# Patient Record
Sex: MALE | Race: WHITE
[De-identification: names, ages, dates, MRNs, and addresses within clinical notes are randomized per-mention and may not be internally consistent; named-entity substitution may affect disease eponyms.]

---

## 2017-05-02 ENCOUNTER — HOSPITAL ENCOUNTER (OUTPATIENT)
Dept: HOSPITAL 87 - LAB | Age: 57
Discharge: HOME | End: 2017-05-02
Attending: INTERNAL MEDICINE
Payer: COMMERCIAL

## 2017-05-02 DIAGNOSIS — K76.89: Primary | ICD-10-CM

## 2017-05-02 LAB
ALBUMIN SERPL BCP-MCNC: 4.2 G/DL (ref 3.4–5)
ALT SERPL W P-5'-P-CCNC: 32 IU/L (ref 13–61)
BILIRUB DIRECT SERPL-MCNC: 0.1 MG/DL (ref 0–0.2)
HEMOLYSIS INTERF INDEX SERPL-ACNC: 1 (ref 1–3)
ICTERIC INTERF INDEX SERPL-ACNC: 1 (ref 1–4)
LIPEMIC INTERF INDEX SERPL-ACNC: 1 (ref 1–3)

## 2017-05-02 PROCEDURE — 36415 COLL VENOUS BLD VENIPUNCTURE: CPT

## 2017-05-02 PROCEDURE — 80076 HEPATIC FUNCTION PANEL: CPT

## 2017-05-19 ENCOUNTER — HOSPITAL ENCOUNTER (OUTPATIENT)
Dept: HOSPITAL 87 - LAB | Age: 57
Discharge: HOME | End: 2017-05-19
Attending: UROLOGY
Payer: COMMERCIAL

## 2017-05-19 DIAGNOSIS — N47.1: Primary | ICD-10-CM

## 2017-05-19 DIAGNOSIS — R35.1: ICD-10-CM

## 2017-05-19 PROCEDURE — 36415 COLL VENOUS BLD VENIPUNCTURE: CPT

## 2017-05-19 PROCEDURE — 84153 ASSAY OF PSA TOTAL: CPT

## 2017-05-22 ENCOUNTER — HOSPITAL ENCOUNTER (OUTPATIENT)
Dept: HOSPITAL 87 - LAB | Age: 57
Discharge: HOME | End: 2017-05-22
Attending: INTERNAL MEDICINE
Payer: COMMERCIAL

## 2017-05-22 DIAGNOSIS — E03.9: Primary | ICD-10-CM

## 2017-05-22 PROCEDURE — 36415 COLL VENOUS BLD VENIPUNCTURE: CPT

## 2017-05-22 PROCEDURE — 84443 ASSAY THYROID STIM HORMONE: CPT

## 2017-08-10 ENCOUNTER — HOSPITAL ENCOUNTER (OUTPATIENT)
Dept: HOSPITAL 87 - MRI | Age: 57
Discharge: HOME | End: 2017-08-10
Payer: COMMERCIAL
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
## 2017-08-10 DIAGNOSIS — R20.0: ICD-10-CM

## 2017-08-10 DIAGNOSIS — M47.894: ICD-10-CM

## 2017-08-10 DIAGNOSIS — M48.06: ICD-10-CM
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
## 2017-08-10 DIAGNOSIS — S30.0XXA: Primary | ICD-10-CM

## 2017-08-10 DIAGNOSIS — S39.002A: ICD-10-CM

## 2017-08-10 PROCEDURE — 72148 MRI LUMBAR SPINE W/O DYE: CPT

## 2017-08-10 PROCEDURE — 72146 MRI CHEST SPINE W/O DYE: CPT

## 2017-09-20 ENCOUNTER — HOSPITAL ENCOUNTER (OUTPATIENT)
Dept: HOSPITAL 87 - LAB | Age: 57
Discharge: HOME | End: 2017-09-20
Attending: INTERNAL MEDICINE
Payer: COMMERCIAL

## 2017-09-20 DIAGNOSIS — Z00.00: Primary | ICD-10-CM

## 2017-09-20 LAB
CHLORIDE SERPL-SCNC: 106 MEQ/L (ref 98–107)
CO2 SERPL-SCNC: 24 MEQ/L (ref 21–32)
HDLC SERPL-MCNC: 40 MG/DL (ref 40–59)
LDLC SERPL DIRECT ASSAY-MCNC: 105 MG/DL (ref 5–100)

## 2017-09-20 PROCEDURE — 80048 BASIC METABOLIC PNL TOTAL CA: CPT

## 2017-09-20 PROCEDURE — 84443 ASSAY THYROID STIM HORMONE: CPT

## 2017-09-20 PROCEDURE — 82306 VITAMIN D 25 HYDROXY: CPT

## 2017-09-20 PROCEDURE — 36415 COLL VENOUS BLD VENIPUNCTURE: CPT

## 2017-09-20 PROCEDURE — 80061 LIPID PANEL: CPT

## 2017-10-24 ENCOUNTER — HOSPITAL ENCOUNTER (OUTPATIENT)
Dept: HOSPITAL 87 - LAB | Age: 57
Discharge: HOME | End: 2017-10-24
Attending: INTERNAL MEDICINE
Payer: COMMERCIAL

## 2017-10-24 DIAGNOSIS — Z00.00: Primary | ICD-10-CM

## 2017-10-24 DIAGNOSIS — E03.9: ICD-10-CM

## 2017-10-24 LAB
APPEARANCE UR: CLEAR
BASOPHILS NFR BLD AUTO: 0.7 % (ref 0–2)
CHLORIDE SERPL-SCNC: 108 MEQ/L (ref 98–107)
CO2 SERPL-SCNC: 24 MEQ/L (ref 21–32)
COLOR UR: YELLOW
EOSINOPHIL NFR BLD AUTO: 0.8 % (ref 0–5)
ERYTHROCYTE [DISTWIDTH] IN BLOOD BY AUTOMATED COUNT: 13.2 % (ref 11.6–14.6)
GLUCOSE UR STRIP.AUTO-MCNC: NEGATIVE MG/DL
HCT VFR BLD AUTO: 44.5 % (ref 42–52)
HDLC SERPL-MCNC: 45 MG/DL (ref 40–59)
HGB BLD-MCNC: 15.2 G/DL (ref 14–18)
HGB UR QL STRIP: (no result)
KETONES UR STRIP-MCNC: NEGATIVE MG/DL
LDLC SERPL DIRECT ASSAY-MCNC: 135 MG/DL (ref 5–100)
LEUKOCYTE ESTERASE UR QL STRIP: NEGATIVE
LYMPHOCYTES NFR BLD AUTO: 41.4 % (ref 20–50)
MCH RBC QN AUTO: 31 PG (ref 28–32)
MCV RBC AUTO: 90.6 FL (ref 80–94)
MONOCYTES NFR BLD AUTO: 8.6 % (ref 2–8)
NEUTROPHILS NFR BLD AUTO: 48.5 % (ref 40–76)
NITRITE UR QL STRIP: NEGATIVE
PH UR STRIP: 5 [PH] (ref 4.5–8)
PLATELET # BLD AUTO: 200 X1000/UL (ref 130–400)
PMV BLD AUTO: 7.6 FL (ref 7.4–10.4)
PROT UR QL STRIP: NEGATIVE
RBC # BLD AUTO: 4.91 MILL/UL (ref 4.7–6.1)
SP GR UR STRIP: 1.03 (ref 1–1.03)
UROBILINOGEN UR STRIP-MCNC: 0.2 E.U./DL (ref 0.2–1)

## 2017-10-24 PROCEDURE — 80053 COMPREHEN METABOLIC PANEL: CPT

## 2017-10-24 PROCEDURE — 84153 ASSAY OF PSA TOTAL: CPT

## 2017-10-24 PROCEDURE — 81001 URINALYSIS AUTO W/SCOPE: CPT

## 2017-10-24 PROCEDURE — 83036 HEMOGLOBIN GLYCOSYLATED A1C: CPT

## 2017-10-24 PROCEDURE — 36415 COLL VENOUS BLD VENIPUNCTURE: CPT

## 2017-10-24 PROCEDURE — 84443 ASSAY THYROID STIM HORMONE: CPT

## 2017-10-24 PROCEDURE — 82270 OCCULT BLOOD FECES: CPT

## 2017-10-24 PROCEDURE — 85025 COMPLETE CBC W/AUTO DIFF WBC: CPT

## 2017-10-24 PROCEDURE — 80061 LIPID PANEL: CPT

## 2017-10-24 PROCEDURE — 82306 VITAMIN D 25 HYDROXY: CPT

## 2017-12-29 ENCOUNTER — HOSPITAL ENCOUNTER (OUTPATIENT)
Dept: HOSPITAL 87 - MRI | Age: 57
Discharge: HOME | End: 2017-12-29
Attending: INTERNAL MEDICINE
Payer: COMMERCIAL

## 2017-12-29 DIAGNOSIS — K76.89: Primary | ICD-10-CM

## 2017-12-29 PROCEDURE — 74183 MRI ABD W/O CNTR FLWD CNTR: CPT

## 2018-02-06 ENCOUNTER — HOSPITAL ENCOUNTER (EMERGENCY)
Dept: HOSPITAL 87 - ER | Age: 58
Discharge: HOME | End: 2018-02-06
Payer: COMMERCIAL

## 2018-02-06 VITALS — HEIGHT: 66 IN | WEIGHT: 214.29 LBS | BODY MASS INDEX: 34.44 KG/M2

## 2018-02-06 VITALS — SYSTOLIC BLOOD PRESSURE: 149 MMHG | DIASTOLIC BLOOD PRESSURE: 90 MMHG

## 2018-02-06 DIAGNOSIS — R20.2: Primary | ICD-10-CM

## 2018-02-06 DIAGNOSIS — Z98.890: ICD-10-CM

## 2018-02-06 DIAGNOSIS — E78.00: ICD-10-CM

## 2018-02-06 DIAGNOSIS — Z79.82: ICD-10-CM

## 2018-02-06 DIAGNOSIS — M10.9: ICD-10-CM

## 2018-02-06 DIAGNOSIS — E03.9: ICD-10-CM

## 2018-02-06 PROCEDURE — 99284 EMERGENCY DEPT VISIT MOD MDM: CPT

## 2018-02-06 PROCEDURE — 70551 MRI BRAIN STEM W/O DYE: CPT

## 2018-04-19 ENCOUNTER — HOSPITAL ENCOUNTER (EMERGENCY)
Dept: HOSPITAL 87 - ER | Age: 58
Discharge: HOME | End: 2018-04-19
Payer: COMMERCIAL

## 2018-04-19 VITALS — HEIGHT: 65 IN | WEIGHT: 220.46 LBS | BODY MASS INDEX: 36.73 KG/M2

## 2018-04-19 VITALS — SYSTOLIC BLOOD PRESSURE: 141 MMHG | DIASTOLIC BLOOD PRESSURE: 92 MMHG

## 2018-04-19 DIAGNOSIS — M25.562: Primary | ICD-10-CM

## 2018-04-19 DIAGNOSIS — F41.9: ICD-10-CM

## 2018-04-19 DIAGNOSIS — E78.00: ICD-10-CM

## 2018-04-19 DIAGNOSIS — Z79.82: ICD-10-CM

## 2018-04-19 DIAGNOSIS — Z98.890: ICD-10-CM

## 2018-04-19 PROCEDURE — 99284 EMERGENCY DEPT VISIT MOD MDM: CPT

## 2018-04-19 PROCEDURE — 73562 X-RAY EXAM OF KNEE 3: CPT

## 2018-04-19 PROCEDURE — 96372 THER/PROPH/DIAG INJ SC/IM: CPT

## 2018-05-04 ENCOUNTER — HOSPITAL ENCOUNTER (OUTPATIENT)
Dept: HOSPITAL 87 - RAD | Age: 58
Discharge: HOME | End: 2018-05-04
Attending: ORTHOPAEDIC SURGERY
Payer: COMMERCIAL

## 2018-05-04 DIAGNOSIS — S83.242A: Primary | ICD-10-CM
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
## 2018-05-04 DIAGNOSIS — M17.12: ICD-10-CM
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
## 2018-05-04 PROCEDURE — 73721 MRI JNT OF LWR EXTRE W/O DYE: CPT

## 2018-05-20 ENCOUNTER — HOSPITAL ENCOUNTER (EMERGENCY)
Dept: HOSPITAL 87 - ER | Age: 58
Discharge: HOME | End: 2018-05-20
Payer: COMMERCIAL

## 2018-05-20 VITALS — DIASTOLIC BLOOD PRESSURE: 85 MMHG | SYSTOLIC BLOOD PRESSURE: 135 MMHG

## 2018-05-20 VITALS — WEIGHT: 213.85 LBS | HEIGHT: 66 IN | BODY MASS INDEX: 34.37 KG/M2

## 2018-05-20 DIAGNOSIS — E03.9: ICD-10-CM

## 2018-05-20 DIAGNOSIS — M79.604: ICD-10-CM

## 2018-05-20 DIAGNOSIS — M62.838: Primary | ICD-10-CM

## 2018-05-20 DIAGNOSIS — M79.81: ICD-10-CM

## 2018-05-20 DIAGNOSIS — E78.00: ICD-10-CM

## 2018-05-20 DIAGNOSIS — F41.9: ICD-10-CM

## 2018-05-20 DIAGNOSIS — Z98.890: ICD-10-CM

## 2018-05-20 DIAGNOSIS — Z79.82: ICD-10-CM

## 2018-05-20 LAB
APPEARANCE UR: CLEAR
APTT PPP: 26.2 SEC (ref 23.4–31)
BASOPHILS NFR BLD AUTO: 0.6 % (ref 0–2)
CHLORIDE SERPL-SCNC: 108 MEQ/L (ref 98–107)
CK SERPL-CCNC: 301 IU/L (ref 39–308)
COLOR UR: YELLOW
EOSINOPHIL NFR BLD AUTO: 1.2 % (ref 0–5)
ERYTHROCYTE [DISTWIDTH] IN BLOOD BY AUTOMATED COUNT: 12.9 % (ref 11.6–14.6)
HCT VFR BLD AUTO: 42.8 % (ref 42–52)
HGB BLD-MCNC: 15.1 G/DL (ref 14–18)
HGB UR QL STRIP: (no result)
INR PPP: 1
KETONES UR STRIP-MCNC: NEGATIVE MG/DL
LEUKOCYTE ESTERASE UR QL STRIP: NEGATIVE
LYMPHOCYTES NFR BLD AUTO: 30.9 % (ref 20–50)
MCH RBC QN AUTO: 31.6 PG (ref 28–32)
MCV RBC AUTO: 89.9 FL (ref 80–94)
MONOCYTES NFR BLD AUTO: 7.6 % (ref 2–8)
NEUTROPHILS NFR BLD AUTO: 59.7 % (ref 40–76)
NITRITE UR QL STRIP: NEGATIVE
PH UR STRIP: 5.5 [PH] (ref 4.5–8)
PLATELET # BLD AUTO: 220 X1000/UL (ref 130–400)
PMV BLD AUTO: 7.4 FL (ref 7.4–10.4)
PROT UR QL STRIP: NEGATIVE
PROTHROMBIN TIME: 10.4 SEC (ref 9.4–11.6)
RBC # BLD AUTO: 4.76 MILL/UL (ref 4.7–6.1)
SP GR UR STRIP: 1.01 (ref 1–1.03)
UROBILINOGEN UR STRIP-MCNC: 0.2 E.U./DL (ref 0.2–1)

## 2018-05-20 PROCEDURE — 84484 ASSAY OF TROPONIN QUANT: CPT

## 2018-05-20 PROCEDURE — 82550 ASSAY OF CK (CPK): CPT

## 2018-05-20 PROCEDURE — 93970 EXTREMITY STUDY: CPT

## 2018-05-20 PROCEDURE — 87040 BLOOD CULTURE FOR BACTERIA: CPT

## 2018-05-20 PROCEDURE — 36415 COLL VENOUS BLD VENIPUNCTURE: CPT

## 2018-05-20 PROCEDURE — 85730 THROMBOPLASTIN TIME PARTIAL: CPT

## 2018-05-20 PROCEDURE — 81003 URINALYSIS AUTO W/O SCOPE: CPT

## 2018-05-20 PROCEDURE — 84443 ASSAY THYROID STIM HORMONE: CPT

## 2018-05-20 PROCEDURE — 85610 PROTHROMBIN TIME: CPT

## 2018-05-20 PROCEDURE — 96374 THER/PROPH/DIAG INJ IV PUSH: CPT

## 2018-05-20 PROCEDURE — 99285 EMERGENCY DEPT VISIT HI MDM: CPT

## 2018-05-20 PROCEDURE — 83880 ASSAY OF NATRIURETIC PEPTIDE: CPT

## 2018-05-20 PROCEDURE — 83690 ASSAY OF LIPASE: CPT

## 2018-05-20 PROCEDURE — 96375 TX/PRO/DX INJ NEW DRUG ADDON: CPT

## 2018-05-20 PROCEDURE — 83605 ASSAY OF LACTIC ACID: CPT

## 2018-05-20 PROCEDURE — 80053 COMPREHEN METABOLIC PANEL: CPT

## 2018-05-20 PROCEDURE — 85025 COMPLETE CBC W/AUTO DIFF WBC: CPT

## 2018-06-10 ENCOUNTER — HOSPITAL ENCOUNTER (INPATIENT)
Dept: HOSPITAL 1 - ED | Age: 58
LOS: 2 days | Discharge: HOME | DRG: 391 | End: 2018-06-12
Attending: FAMILY MEDICINE | Admitting: FAMILY MEDICINE
Payer: COMMERCIAL

## 2018-06-10 VITALS
HEIGHT: 65.98 IN | BODY MASS INDEX: 33.75 KG/M2 | BODY MASS INDEX: 33.75 KG/M2 | WEIGHT: 209.99 LBS | WEIGHT: 209.99 LBS | HEIGHT: 65.98 IN

## 2018-06-10 DIAGNOSIS — G89.29: ICD-10-CM

## 2018-06-10 DIAGNOSIS — K57.30: ICD-10-CM

## 2018-06-10 DIAGNOSIS — F41.1: ICD-10-CM

## 2018-06-10 DIAGNOSIS — M54.9: ICD-10-CM

## 2018-06-10 DIAGNOSIS — E03.9: ICD-10-CM

## 2018-06-10 DIAGNOSIS — R31.9: ICD-10-CM

## 2018-06-10 DIAGNOSIS — Z79.899: ICD-10-CM

## 2018-06-10 DIAGNOSIS — E66.9: ICD-10-CM

## 2018-06-10 DIAGNOSIS — E78.5: ICD-10-CM

## 2018-06-10 DIAGNOSIS — E78.00: ICD-10-CM

## 2018-06-10 DIAGNOSIS — N17.0: ICD-10-CM

## 2018-06-10 DIAGNOSIS — K52.9: Primary | ICD-10-CM

## 2018-06-10 LAB
ALBUMIN SERPL-MCNC: 3.5 G/DL (ref 3.4–5)
ALP SERPL-CCNC: 92 U/L (ref 46–116)
ALT SERPL-CCNC: 26 U/L (ref 16–63)
AMPHETAMINES UR QL SCN: (no result)
AMYLASE SERPL-CCNC: 60 U/L (ref 25–115)
AST SERPL-CCNC: 20 U/L (ref 15–37)
BASOPHILS NFR BLD: 0.4 % (ref 0–2)
BILIRUB SERPL-MCNC: 0.3 MG/DL (ref 0.2–1)
BUN SERPL-MCNC: 24 MG/DL (ref 7–18)
CALCIUM SERPL-MCNC: 8.6 MG/DL (ref 8.5–10.1)
CHLORIDE SERPL-SCNC: 107 MMOL/L (ref 98–107)
CO2 SERPL-SCNC: 26.1 MMOL/L (ref 21–32)
CREAT SERPL-MCNC: 1.2 MG/DL (ref 0.7–1.3)
ERYTHROCYTE [DISTWIDTH] IN BLOOD BY AUTOMATED COUNT: 12.9 % (ref 11.5–14.5)
GFR SERPLBLD BASED ON 1.73 SQ M-ARVRAT: > 60 ML/MIN
GLUCOSE SERPL-MCNC: 95 MG/DL (ref 74–106)
LIPASE SERPL-CCNC: 138 IU/L (ref 73–393)
MAGNESIUM SERPL-MCNC: 2 MG/DL (ref 1.8–2.4)
MICROSCOPIC UR-IMP: YES
PHOSPHATE SERPL-MCNC: 3.9 MG/DL (ref 2.5–4.9)
PLATELET # BLD: 201 X10^3MCL (ref 130–400)
POTASSIUM SERPL-SCNC: 3.7 MMOL/L (ref 3.5–5.1)
PROT SERPL-MCNC: 7.1 G/DL (ref 6.4–8.2)
RBC # UR STRIP.AUTO: (no result) /UL
SODIUM SERPL-SCNC: 142 MMOL/L (ref 136–145)
T3RU NFR SERPL: 34 % UPTAKE (ref 33–39)
T4 FREE SERPL-MCNC: 1.02 NG/DL (ref 0.76–1.46)
T4 SERPL-MCNC: 7.5 UG/DL (ref 4.7–13.3)
T4/T3 UPTAKE INDEX SERPL: 2.6 UG/DL (ref 1.4–4.5)
UA SPECIFIC GRAVITY: 1.02 (ref 1–1.03)

## 2018-06-11 VITALS — DIASTOLIC BLOOD PRESSURE: 83 MMHG | SYSTOLIC BLOOD PRESSURE: 131 MMHG

## 2018-06-11 VITALS — SYSTOLIC BLOOD PRESSURE: 118 MMHG | DIASTOLIC BLOOD PRESSURE: 73 MMHG

## 2018-06-11 VITALS — DIASTOLIC BLOOD PRESSURE: 87 MMHG | SYSTOLIC BLOOD PRESSURE: 131 MMHG

## 2018-06-11 VITALS — DIASTOLIC BLOOD PRESSURE: 78 MMHG | SYSTOLIC BLOOD PRESSURE: 126 MMHG

## 2018-06-11 VITALS — SYSTOLIC BLOOD PRESSURE: 132 MMHG | DIASTOLIC BLOOD PRESSURE: 71 MMHG

## 2018-06-11 VITALS — SYSTOLIC BLOOD PRESSURE: 138 MMHG | DIASTOLIC BLOOD PRESSURE: 80 MMHG

## 2018-06-11 LAB
BASOPHILS NFR BLD: 0.3 % (ref 0–2)
BUN SERPL-MCNC: 18 MG/DL (ref 7–18)
CALCIUM SERPL-MCNC: 8.2 MG/DL (ref 8.5–10.1)
CHLORIDE SERPL-SCNC: 107 MMOL/L (ref 98–107)
CHOLEST SERPL-MCNC: 166 MG/DL (ref ?–200)
CHOLEST/HDLC SERPL: 5 MG/DL
CO2 SERPL-SCNC: 26.8 MMOL/L (ref 21–32)
CREAT SERPL-MCNC: 1.1 MG/DL (ref 0.7–1.3)
ERYTHROCYTE [DISTWIDTH] IN BLOOD BY AUTOMATED COUNT: 12.9 % (ref 11.5–14.5)
GFR SERPLBLD BASED ON 1.73 SQ M-ARVRAT: > 60 ML/MIN
GLUCOSE SERPL-MCNC: 78 MG/DL (ref 74–106)
HDLC SERPL-MCNC: 33 MG/DL (ref 40–60)
MICROSCOPIC UR-IMP: YES
PLATELET # BLD: 174 X10^3MCL (ref 130–400)
POTASSIUM SERPL-SCNC: 3.8 MMOL/L (ref 3.5–5.1)
RBC # UR STRIP.AUTO: (no result) /UL
SODIUM SERPL-SCNC: 141 MMOL/L (ref 136–145)
T3 SERPL-MCNC: 1.22 NG/ML
TRIGL SERPL-MCNC: 127 MG/DL (ref ?–150)
UA SPECIFIC GRAVITY: 1.01 (ref 1–1.03)

## 2018-06-12 VITALS — DIASTOLIC BLOOD PRESSURE: 81 MMHG | SYSTOLIC BLOOD PRESSURE: 134 MMHG

## 2018-06-12 VITALS — SYSTOLIC BLOOD PRESSURE: 130 MMHG | DIASTOLIC BLOOD PRESSURE: 79 MMHG

## 2018-06-12 VITALS — DIASTOLIC BLOOD PRESSURE: 68 MMHG | SYSTOLIC BLOOD PRESSURE: 116 MMHG

## 2018-06-12 LAB
BASOPHILS NFR BLD: 0.4 % (ref 0–2)
BUN SERPL-MCNC: 13 MG/DL (ref 7–18)
CALCIUM SERPL-MCNC: 8.7 MG/DL (ref 8.5–10.1)
CHLORIDE SERPL-SCNC: 105 MMOL/L (ref 98–107)
CO2 SERPL-SCNC: 26 MMOL/L (ref 21–32)
CREAT SERPL-MCNC: 1.1 MG/DL (ref 0.7–1.3)
ERYTHROCYTE [DISTWIDTH] IN BLOOD BY AUTOMATED COUNT: 12.9 % (ref 11.5–14.5)
GFR SERPLBLD BASED ON 1.73 SQ M-ARVRAT: > 60 ML/MIN
GLUCOSE SERPL-MCNC: 84 MG/DL (ref 74–106)
PLATELET # BLD: 187 X10^3MCL (ref 130–400)
POTASSIUM SERPL-SCNC: 3.9 MMOL/L (ref 3.5–5.1)
SODIUM SERPL-SCNC: 140 MMOL/L (ref 136–145)

## 2019-02-25 ENCOUNTER — HOSPITAL ENCOUNTER (OUTPATIENT)
Dept: HOSPITAL 87 - LAB | Age: 59
Discharge: HOME | End: 2019-02-25
Attending: INTERNAL MEDICINE
Payer: COMMERCIAL

## 2019-02-25 DIAGNOSIS — R07.89: Primary | ICD-10-CM

## 2019-02-25 DIAGNOSIS — R10.11: ICD-10-CM

## 2019-02-25 LAB
APPEARANCE UR: CLEAR
BASOPHILS NFR BLD AUTO: 0.5 % (ref 0–2)
CARCINO EMBRYONIC ANTIGEN: 1.9 NG/ML
CHLORIDE SERPL-SCNC: 111 MEQ/L (ref 98–107)
COLOR UR: YELLOW
EOSINOPHIL NFR BLD AUTO: 0.8 % (ref 0–5)
ERYTHROCYTE [DISTWIDTH] IN BLOOD BY AUTOMATED COUNT: 13.3 % (ref 11.6–14.6)
HCT VFR BLD AUTO: 46.7 % (ref 42–52)
HDLC SERPL-MCNC: 41 MG/DL (ref 40–59)
HGB BLD-MCNC: 16.3 G/DL (ref 14–18)
HGB UR QL STRIP: (no result)
KETONES UR STRIP-MCNC: NEGATIVE MG/DL
LDLC SERPL DIRECT ASSAY-MCNC: 121 MG/DL (ref 5–100)
LEUKOCYTE ESTERASE UR QL STRIP: NEGATIVE
LYMPHOCYTES NFR BLD AUTO: 44.4 % (ref 20–50)
MCH RBC QN AUTO: 31.9 PG (ref 28–32)
MCV RBC AUTO: 91.2 FL (ref 80–94)
MONOCYTES NFR BLD AUTO: 7.1 % (ref 2–8)
NEUTROPHILS NFR BLD AUTO: 47.2 % (ref 40–76)
NITRITE UR QL STRIP: NEGATIVE
PH UR STRIP: 5.5 [PH] (ref 4.5–8)
PLATELET # BLD AUTO: 192 X1000/UL (ref 130–400)
PMV BLD AUTO: 7.6 FL (ref 7.4–10.4)
PROSTRATE SPECIFIC AG TOTAL: 3.44 NG/ML (ref 0–4)
PROT UR QL STRIP: NEGATIVE
RBC # BLD AUTO: 5.13 MILL/UL (ref 4.7–6.1)
SP GR UR STRIP: 1.02 (ref 1–1.03)
UROBILINOGEN UR STRIP-MCNC: 0.2 E.U./DL (ref 0.2–1)

## 2019-02-25 PROCEDURE — 84443 ASSAY THYROID STIM HORMONE: CPT

## 2019-02-25 PROCEDURE — 82378 CARCINOEMBRYONIC ANTIGEN: CPT

## 2019-02-25 PROCEDURE — 36415 COLL VENOUS BLD VENIPUNCTURE: CPT

## 2019-02-25 PROCEDURE — 85651 RBC SED RATE NONAUTOMATED: CPT

## 2019-02-25 PROCEDURE — 84153 ASSAY OF PSA TOTAL: CPT

## 2019-02-25 PROCEDURE — G0103 PSA SCREENING: HCPCS

## 2019-02-25 PROCEDURE — 80061 LIPID PANEL: CPT

## 2019-02-25 PROCEDURE — 71100 X-RAY EXAM RIBS UNI 2 VIEWS: CPT

## 2019-02-25 PROCEDURE — 71046 X-RAY EXAM CHEST 2 VIEWS: CPT

## 2019-02-25 PROCEDURE — 83036 HEMOGLOBIN GLYCOSYLATED A1C: CPT

## 2019-05-10 ENCOUNTER — HOSPITAL ENCOUNTER (OUTPATIENT)
Dept: HOSPITAL 87 - LAB | Age: 59
Discharge: HOME | End: 2019-05-10
Attending: INTERNAL MEDICINE
Payer: COMMERCIAL

## 2019-05-10 DIAGNOSIS — R31.9: Primary | ICD-10-CM

## 2019-05-10 LAB
APPEARANCE UR: CLEAR
COLOR UR: YELLOW
HDLC SERPL-MCNC: 38 MG/DL (ref 40–59)
HGB UR QL STRIP: (no result)
KETONES UR STRIP-MCNC: NEGATIVE MG/DL
LDLC SERPL DIRECT ASSAY-MCNC: 158 MG/DL (ref 5–100)
LEUKOCYTE ESTERASE UR QL STRIP: NEGATIVE
NITRITE UR QL STRIP: NEGATIVE
PH UR STRIP: 6 [PH] (ref 4.5–8)
PROT UR QL STRIP: NEGATIVE
SP GR UR STRIP: 1.02 (ref 1–1.03)
UROBILINOGEN UR STRIP-MCNC: 0.2 E.U./DL (ref 0.2–1)

## 2019-05-10 PROCEDURE — 84153 ASSAY OF PSA TOTAL: CPT

## 2019-05-10 PROCEDURE — 84443 ASSAY THYROID STIM HORMONE: CPT

## 2019-05-10 PROCEDURE — G0103 PSA SCREENING: HCPCS

## 2019-05-10 PROCEDURE — 80061 LIPID PANEL: CPT

## 2019-05-10 PROCEDURE — 36415 COLL VENOUS BLD VENIPUNCTURE: CPT

## 2019-07-22 ENCOUNTER — HOSPITAL ENCOUNTER (INPATIENT)
Dept: HOSPITAL 1 - ED | Age: 59
LOS: 3 days | Discharge: HOME | DRG: 392 | End: 2019-07-25
Attending: HOSPITALIST | Admitting: HOSPITALIST
Payer: COMMERCIAL

## 2019-07-22 VITALS — DIASTOLIC BLOOD PRESSURE: 99 MMHG | SYSTOLIC BLOOD PRESSURE: 167 MMHG

## 2019-07-22 VITALS
WEIGHT: 260.06 LBS | BODY MASS INDEX: 41.79 KG/M2 | HEIGHT: 66 IN | HEIGHT: 66 IN | WEIGHT: 260.06 LBS | BODY MASS INDEX: 41.79 KG/M2

## 2019-07-22 DIAGNOSIS — F41.1: ICD-10-CM

## 2019-07-22 DIAGNOSIS — G50.0: ICD-10-CM

## 2019-07-22 DIAGNOSIS — I10: ICD-10-CM

## 2019-07-22 DIAGNOSIS — E78.5: ICD-10-CM

## 2019-07-22 DIAGNOSIS — E03.9: ICD-10-CM

## 2019-07-22 DIAGNOSIS — K57.32: Primary | ICD-10-CM

## 2019-07-22 DIAGNOSIS — E78.00: ICD-10-CM

## 2019-07-22 LAB
ALBUMIN SERPL-MCNC: 4.2 G/DL (ref 3.4–5)
ALP SERPL-CCNC: 80 U/L (ref 46–116)
ALT SERPL-CCNC: 53 U/L (ref 16–63)
AMYLASE SERPL-CCNC: 55 U/L (ref 25–115)
AST SERPL-CCNC: 22 U/L (ref 15–37)
BASOPHILS NFR BLD: 0.3 % (ref 0–2)
BILIRUB SERPL-MCNC: 0.68 MG/DL (ref 0.2–1)
BUN SERPL-MCNC: 17 MG/DL (ref 7–18)
CALCIUM SERPL-MCNC: 9.8 MG/DL (ref 8.5–10.1)
CHLORIDE SERPL-SCNC: 103 MMOL/L (ref 98–107)
CHOLEST SERPL-MCNC: 182 MG/DL (ref ?–200)
CHOLEST/HDLC SERPL: 4.3 MG/DL
CO2 SERPL-SCNC: 28.4 MMOL/L (ref 21–32)
CREAT SERPL-MCNC: 1.3 MG/DL (ref 0.7–1.3)
ERYTHROCYTE [DISTWIDTH] IN BLOOD BY AUTOMATED COUNT: 13.2 % (ref 11.5–14.5)
GFR SERPLBLD BASED ON 1.73 SQ M-ARVRAT: 60 ML/MIN
GLUCOSE SERPL-MCNC: 94 MG/DL (ref 74–106)
HDLC SERPL-MCNC: 42 MG/DL (ref 40–60)
LIPASE SERPL-CCNC: 95 IU/L (ref 73–393)
MAGNESIUM SERPL-MCNC: 2 MG/DL (ref 1.8–2.4)
PHOSPHATE SERPL-MCNC: 3.8 MG/DL (ref 2.5–4.9)
PLATELET # BLD: 205 X10^3MCL (ref 130–400)
POTASSIUM SERPL-SCNC: 4.3 MMOL/L (ref 3.5–5.1)
PROT SERPL-MCNC: 7.9 G/DL (ref 6.4–8.2)
SODIUM SERPL-SCNC: 136 MMOL/L (ref 136–145)
T3 SERPL-MCNC: 1.22 NG/ML
T3RU NFR SERPL: 32 % UPTAKE (ref 33–39)
T4 FREE SERPL-MCNC: 1.04 NG/DL (ref 0.76–1.46)
T4 SERPL-MCNC: 8.4 UG/DL (ref 4.7–13.3)
T4/T3 UPTAKE INDEX SERPL: 2.7 UG/DL (ref 1.4–4.5)
TRIGL SERPL-MCNC: 149 MG/DL (ref ?–150)

## 2019-07-22 PROCEDURE — G0378 HOSPITAL OBSERVATION PER HR: HCPCS

## 2019-07-22 NOTE — NUR
PT COMES TO ER WITH C/O LOWER MID ABDOMEN INTERMITTENT PAIN 9/10 AT THIS TIME,
NON RADIATING THATS STARTED LAST NIGHT. DENIES ANY NEW FOODS, NO ON ELSE SICK
AT HOME. LAST BM THIS AM-SMALL., PASSING GAS. ABD LARGE, SOFT, TENDER WITH
PALPATION, DENIES DYSURIA. HX OF HERNIA -MID ABDOMEN WITH SURGERY 3YEARS AGO.
RESP EVEN AND UNALBORED, ON RA@99%.

## 2019-07-22 NOTE — NUR
RECEIVED PT FROM ED VIA DAVID. ORIENTED PT TO ROOM AND SURROUNDINGS. IV
NOTED TO RFA PATENT AND INTACT. INSTRUCTED PT ON THE USE OF CALL LIGHT FOR
ASSISTANCE. ENDORSED PT TO PRIMARY NURSE AIDA

## 2019-07-23 VITALS — DIASTOLIC BLOOD PRESSURE: 84 MMHG | SYSTOLIC BLOOD PRESSURE: 146 MMHG

## 2019-07-23 VITALS — DIASTOLIC BLOOD PRESSURE: 70 MMHG | SYSTOLIC BLOOD PRESSURE: 116 MMHG

## 2019-07-23 VITALS — SYSTOLIC BLOOD PRESSURE: 113 MMHG | DIASTOLIC BLOOD PRESSURE: 69 MMHG

## 2019-07-23 VITALS — SYSTOLIC BLOOD PRESSURE: 114 MMHG | DIASTOLIC BLOOD PRESSURE: 70 MMHG

## 2019-07-23 VITALS — DIASTOLIC BLOOD PRESSURE: 68 MMHG | SYSTOLIC BLOOD PRESSURE: 111 MMHG

## 2019-07-23 LAB
AMPHETAMINES UR QL SCN: (no result)
BASOPHILS NFR BLD: 0.4 % (ref 0–2)
BUN SERPL-MCNC: 14 MG/DL (ref 7–18)
CALCIUM SERPL-MCNC: 8.9 MG/DL (ref 8.5–10.1)
CHLORIDE SERPL-SCNC: 107 MMOL/L (ref 98–107)
CO2 SERPL-SCNC: 26 MMOL/L (ref 21–32)
CREAT SERPL-MCNC: 1.2 MG/DL (ref 0.7–1.3)
ERYTHROCYTE [DISTWIDTH] IN BLOOD BY AUTOMATED COUNT: 12.7 % (ref 11.5–14.5)
GFR SERPLBLD BASED ON 1.73 SQ M-ARVRAT: > 60 ML/MIN
GLUCOSE SERPL-MCNC: 83 MG/DL (ref 74–106)
MAGNESIUM SERPL-MCNC: 1.9 MG/DL (ref 1.8–2.4)
MICROSCOPIC UR-IMP: YES
PHOSPHATE SERPL-MCNC: 4.1 MG/DL (ref 2.5–4.9)
PLATELET # BLD: 177 X10^3MCL (ref 130–400)
POTASSIUM SERPL-SCNC: 3.8 MMOL/L (ref 3.5–5.1)
RBC # UR STRIP.AUTO: (no result) /UL
SODIUM SERPL-SCNC: 142 MMOL/L (ref 136–145)
UA SPECIFIC GRAVITY: <=1.005 (ref 1–1.03)

## 2019-07-23 NOTE — NUR
RECEIVED PT AOX4 ABLE TO RESPOND TO COMMANDS, MAKE NEEDS KNOWN. SPEECH CLEAR,
NO HEADACHE, PUPILS BRISK AND PERRLA.TRACHEA MIDLINE, NO DRAINAGE TO EENT. NO
EENT COMPLAINTS. LUNG SOUNDS CLEAR B/L, CHEST RISE/FALL SYMMETRIC, E/U
BREATHING.  NO ACUTE RESP DISTRESS.S1/S2 SOUNDS, DENIES CHEST PAIN. PULSES
MODERATE X4, CAP REFILL <3 SEC, SKIN COLOR CONSISTENT WITH ETHNICITY, NO
EDEMA. NS INFUSING @ 70 ML/HR.NO S/S OF N/V.ACTIVE BOWEL SOUNDS X4 QUADRANTS,
ABD SOFT/ROUND, TENDER TO TOUCH TO LOWER ABD.PT ABLE TO VOID FREELY, NO
COMPLAINTS. NO PENILE EDEMA OR DISCHARGE. SKIN INTACT, WARM/DRY TO TOUCH. BED
AT LOWEST SETTING, CALL LIGHT WITHIN REACH, HOB ELEVATED 30 DEGREES. WILL CONT
TO MONITOR.

## 2019-07-23 NOTE — NUR
PT SLEPT COMFORTABLY IN INTERVALS THROUGHOUT THE NIGHT. EVEN AND UNLABORED
RESPIRATIONS ON RA. IV PATENT AND INTACT RUNNING FLUIDS PER EMAR. ALL NEEDS
TENDED TO AND MET. ALL SCHEDULED MEDICATIONS GIVEN. C/O HEADACHE AND ABD PAIN
MEDICATED PER EMAR. BED IN LOWEST POSITION. SIDE RAILS UPX2. CALL LIGHT WITHIN
REACH. WILL ENDORSE TO ONCOMING SHIFT.

## 2019-07-23 NOTE — NUR
PT REQUESTING TO TAKE A SHOWER. SPOKE WITH DR. YOUNGBLOOD AND PER DR. YOUNGBLOOD, PT
OK TO TAKE A SHOWER AT THIS TIME. CNA MADE AWARE.

## 2019-07-23 NOTE — NUR
PT C/O ANXIETY, MEDICATED PER EMAR. ABD PAIN RESOLVING. EVEN AND UNLABORED
RESPIRTAIONS ON RA. IV PATENT AND INTACT RUNNING FLUIDS PER EMAR. PT AMBULATED
TO BATHROOM WITH STEADY GAIT, ABLE TO URINATE WITH NO PROBLEMS. BED IN LOWEST
POSITION. SIDE RAILS UPX2. CALL LIGHT WITHIN REACH. WILL CONTINUE TO MONITOR.

## 2019-07-23 NOTE — NUR
PT RESTING AT THIS TIME. DENIES ANY DISCOMFORT. NO FURTHER HA. CALL LIGHT IN
REACH NEEDS ATTENDED TO.

## 2019-07-23 NOTE — NUR
RECEIVED PT IN BED A/A/OX4 DENIES HA, RESP EVEN AND UNLABORED WITH CLEAR BS
BILAT DENIES SOB/CP/PRESSURE AT THIS TIME. NO EDEMA NOTED. ABD SOFT, TENDER
TO TOUCH TO ACTIVE BS X4. DENIES ANY N/V AT THIS TIME. ADVANCED TO CLEAR
LIQUID DIET. VOIDING FREELY AND AMBULATORY. CALL LIGHT IN REACH NEEDS ATTENDED
TO.

## 2019-07-23 NOTE — NUR
UPON ENTERING ROOM PT IN NO ACUTE DISTRESS, CHEST RISE/FALL SYMMETRIC, E/U
BREATHING, EASILY ARUOSABLE TO STIMULI. PT DENIES ANY SOB OR PAIN AT THIS
TIME.

## 2019-07-24 VITALS — DIASTOLIC BLOOD PRESSURE: 85 MMHG | SYSTOLIC BLOOD PRESSURE: 130 MMHG

## 2019-07-24 VITALS — DIASTOLIC BLOOD PRESSURE: 71 MMHG | SYSTOLIC BLOOD PRESSURE: 130 MMHG

## 2019-07-24 VITALS — DIASTOLIC BLOOD PRESSURE: 76 MMHG | SYSTOLIC BLOOD PRESSURE: 128 MMHG

## 2019-07-24 VITALS — DIASTOLIC BLOOD PRESSURE: 64 MMHG | SYSTOLIC BLOOD PRESSURE: 119 MMHG

## 2019-07-24 LAB
BASOPHILS NFR BLD: 0.4 % (ref 0–2)
BUN SERPL-MCNC: 11 MG/DL (ref 7–18)
CALCIUM SERPL-MCNC: 9.1 MG/DL (ref 8.5–10.1)
CHLORIDE SERPL-SCNC: 109 MMOL/L (ref 98–107)
CO2 SERPL-SCNC: 25.9 MMOL/L (ref 21–32)
CREAT SERPL-MCNC: 1.2 MG/DL (ref 0.7–1.3)
ERYTHROCYTE [DISTWIDTH] IN BLOOD BY AUTOMATED COUNT: 13 % (ref 11.5–14.5)
GFR SERPLBLD BASED ON 1.73 SQ M-ARVRAT: > 60 ML/MIN
GLUCOSE SERPL-MCNC: 83 MG/DL (ref 74–106)
MAGNESIUM SERPL-MCNC: 2 MG/DL (ref 1.8–2.4)
PHOSPHATE SERPL-MCNC: 3.6 MG/DL (ref 2.5–4.9)
PLATELET # BLD: 182 X10^3MCL (ref 130–400)
POTASSIUM SERPL-SCNC: 4.1 MMOL/L (ref 3.5–5.1)
SODIUM SERPL-SCNC: 143 MMOL/L (ref 136–145)

## 2019-07-24 NOTE — NUR
AWAKE AND ALERT. ABLE TO MALE NEEDS KNOWN. SKIN WARM AND DRY TO TOUCH.
RESPIRATION EVEN AND UNLABORED. DENIES ANY PAIN/DISCOMFORT. ADVICED TO UTILIZE
CALL LIGHT WHEN ASSISTANCE AS NEEDED . NON-SKIN SOCKS IN PALCE. BED LOCKED AND
IN LOWEST POSITION. CALL LIGHT WITHIN REACH,

## 2019-07-24 NOTE — NUR
PATIENT SEEN RESTING IN BED WITH NO COMPLAINTS OF PAIN OR DISCOMFORT. IV ON
RFA PATENT AND INUFSING. NO NEW ISSUES.

## 2019-07-24 NOTE — NUR
PAGED FOR DIET ADVANCEMENT. ALSO REQUESTING TO BE DISCHARGED BY 10AM
TOMORROW FOR AN APPOINTMENT AT 12PM.  NOTIFIED.

## 2019-07-24 NOTE — NUR
PT AWAKE/ALERT UPON ENTERING ROOM. DENIES SOB OR PAIN AT THIS TIME. NO ACUTE
DISTRESS. IV TO RIGHT FOREARM, PATENT, INFUSING @ 70 ML/HR.

## 2019-07-24 NOTE — NUR
PT REQUESING MEDICATION FOR ANXIETY, XANAX 0.5MG 2TABS GIVEN PO AS PRN
MEDICATION. WILL CONTINUE TO MONITOR.

## 2019-07-24 NOTE — NUR
UPON ENTERING ROOM PT IN NO ACUTE DISTRESS, CHEST RISE/FALL SYMMETRIC, E/U
BREATHING, EASILY ARUOSABLE TO STIMULI. PT DENIES ANY SOB OR PAIN AT THIS
TIME. PT REPORTING HE WENT TO THE RESTROOM BUT HAS NOT HAD A BOWEL MOVEMENT.

## 2019-07-24 NOTE — NUR
ASSUMED CARE OF PATIENT. SEEN SLEEPING THIS MORNING WITH EQUAL AND UNLABOERD
RESPIRATIONS. NO APPARENT DISTRESS NOTED. IV ON RFA INFUSING 70ML/HR NS. NO
REDNESS/SWELLING NOTED. WILL CONTINUE TO MONITOR.

## 2019-07-24 NOTE — NUR
PATIENT SEEN IN BED WITH FAMILY AT BEDSIDE. REPORTS STOMACH FEELING "BETTER."
NO APPARENT DISTRESS NOTED. IV ON RFA PATENT AND INFUSING NS AT 70ML/HR. WILL
ENDORSE CARE TO ONCOMING RN.

## 2019-07-25 VITALS — SYSTOLIC BLOOD PRESSURE: 144 MMHG | DIASTOLIC BLOOD PRESSURE: 84 MMHG

## 2019-07-25 VITALS — SYSTOLIC BLOOD PRESSURE: 111 MMHG | DIASTOLIC BLOOD PRESSURE: 57 MMHG

## 2019-07-25 LAB
BASOPHILS NFR BLD: 0.3 % (ref 0–2)
BUN SERPL-MCNC: 15 MG/DL (ref 7–18)
CALCIUM SERPL-MCNC: 8.9 MG/DL (ref 8.5–10.1)
CHLORIDE SERPL-SCNC: 108 MMOL/L (ref 98–107)
CO2 SERPL-SCNC: 22.8 MMOL/L (ref 21–32)
CREAT SERPL-MCNC: 1.2 MG/DL (ref 0.7–1.3)
ERYTHROCYTE [DISTWIDTH] IN BLOOD BY AUTOMATED COUNT: 12.8 % (ref 11.5–14.5)
GFR SERPLBLD BASED ON 1.73 SQ M-ARVRAT: > 60 ML/MIN
GLUCOSE SERPL-MCNC: 88 MG/DL (ref 74–106)
MAGNESIUM SERPL-MCNC: 1.9 MG/DL (ref 1.8–2.4)
PHOSPHATE SERPL-MCNC: 3.9 MG/DL (ref 2.5–4.9)
PLATELET # BLD: 204 X10^3MCL (ref 130–400)
POTASSIUM SERPL-SCNC: 4 MMOL/L (ref 3.5–5.1)
SODIUM SERPL-SCNC: 142 MMOL/L (ref 136–145)

## 2019-07-25 NOTE — NUR
RECEIVED DISCHARGE ORDER. REVIEWED WRITTEN AND VERBAL DISCHARGE INSTRUCTIONS
WITH PATIENT ON DIVERTICULITIS, OUTPATIENT FOLLOWUP, SIGNS AND SYMPTOMS TO
SEEK EMERGENCY MEDICAL ATTENTION. IV TO RFA REMOVED, SITE WITHOUT
COMPLICATIONS. PATIENT ESCORTED TO LOBBY, WITH ALL BELONGINGS, IN NO DISTRESS,
AMBULATORY.

## 2019-07-25 NOTE — NUR
EYES CLSOED, APPARENTLY ASLEEP SOUNDLY, AROUSAVBLE TO VERBAL STIMULI.
RESPIRATION EVEN AND UNLABORED.

## 2019-07-25 NOTE — NUR
SHIFT ASSESSMENT PERFORMED AND DOCUMENTED. PATIENT SITTING UP EATING BREAKFAST
NO DISTRESS NOTED. REPORTS JUST MILD TENDERNESS TO PALPATION OF LLQ,
TOLERABLE, NO PAIN MED REQUIRED. DENIES PASSING GAS OR BM SINCE 7/22/19.
LACTULOSE GIVEN BY [facility] NURSE. WILL MONITOR. DISCONNECTED IV SO PATIENT COULD
SHOWER, PROVIDED CLEAN GOWN, TOWLS, ETC.

## 2019-07-25 NOTE — NUR
RCD REPORT FROM KELLI MOLINA. PATIENT AWAKE, NO DISTRESS. NS 70 ML/HR TO RFA
WITHOUT COMPLICATIONS. BED LOW, CALL LIGHT WITHIN REACH. WILL MONITOR.

## 2019-07-25 NOTE — NUR
ASSUMED THE CARE FROM KELLI HURST, PT ASLEEP NO S/SX OF PAIN OR DISCOMFORTS NO
DISTRESS, IVF INFUSING WELL AS ORDERED, VISUAL CHECKED AT INTERVALS.

## 2019-07-25 NOTE — NUR
PT ASKING FOR LAXATIVES HE WANTED TO HAVE BM PRIOR DISCHARGE HOME, PLAN
DISCHARGE TODAY, MD AWARE AND ORDERED LACTULOSE PO, MEDS GIVEN AS INDICATED,
DUE ATB ALSO HANGED NO ADV REACTION WILL MONITOR.

## 2019-08-28 ENCOUNTER — HOSPITAL ENCOUNTER (EMERGENCY)
Dept: HOSPITAL 1 - ED | Age: 59
Discharge: HOME | End: 2019-08-28
Payer: COMMERCIAL

## 2019-08-28 VITALS — BODY MASS INDEX: 35.84 KG/M2 | WEIGHT: 223 LBS | HEIGHT: 66 IN

## 2019-08-28 VITALS — DIASTOLIC BLOOD PRESSURE: 99 MMHG | SYSTOLIC BLOOD PRESSURE: 145 MMHG

## 2019-08-28 DIAGNOSIS — H57.12: Primary | ICD-10-CM

## 2019-09-04 ENCOUNTER — HOSPITAL ENCOUNTER (OUTPATIENT)
Dept: HOSPITAL 87 - RAD | Age: 59
Discharge: HOME | End: 2019-09-04
Attending: INTERNAL MEDICINE
Payer: COMMERCIAL

## 2019-09-04 DIAGNOSIS — M17.12: Primary | ICD-10-CM

## 2019-09-04 PROCEDURE — 73562 X-RAY EXAM OF KNEE 3: CPT

## 2019-10-02 ENCOUNTER — HOSPITAL ENCOUNTER (OUTPATIENT)
Dept: HOSPITAL 87 - MRI | Age: 59
Discharge: HOME | End: 2019-10-02
Attending: OPHTHALMOLOGY
Payer: COMMERCIAL

## 2019-10-02 DIAGNOSIS — G43.809: Primary | ICD-10-CM

## 2019-10-02 PROCEDURE — 70540 MRI ORBIT/FACE/NECK W/O DYE: CPT

## 2019-10-02 PROCEDURE — 70551 MRI BRAIN STEM W/O DYE: CPT

## 2020-02-12 ENCOUNTER — HOSPITAL ENCOUNTER (OUTPATIENT)
Dept: HOSPITAL 87 - LAB | Age: 60
Discharge: HOME | End: 2020-02-12
Attending: INTERNAL MEDICINE
Payer: COMMERCIAL

## 2020-02-12 DIAGNOSIS — K57.30: ICD-10-CM

## 2020-02-12 DIAGNOSIS — E03.9: Primary | ICD-10-CM

## 2020-02-12 DIAGNOSIS — M10.9: ICD-10-CM

## 2020-02-12 LAB
APPEARANCE UR: CLEAR
BASOPHILS NFR BLD AUTO: 0.7 % (ref 0–2)
CHLORIDE SERPL-SCNC: 110 MEQ/L (ref 98–107)
COLOR UR: YELLOW
EOSINOPHIL NFR BLD AUTO: 0.6 % (ref 0–5)
ERYTHROCYTE [DISTWIDTH] IN BLOOD BY AUTOMATED COUNT: 12.9 % (ref 11.6–14.6)
HCT VFR BLD AUTO: 46.2 % (ref 42–52)
HDLC SERPL-MCNC: 42 MG/DL (ref 40–59)
HGB BLD-MCNC: 16.1 G/DL (ref 14–18)
HGB UR QL STRIP: (no result)
KETONES UR STRIP-MCNC: NEGATIVE MG/DL
LDLC SERPL DIRECT ASSAY-MCNC: 104 MG/DL (ref 5–100)
LEUKOCYTE ESTERASE UR QL STRIP: NEGATIVE
LYMPHOCYTES NFR BLD AUTO: 39 % (ref 20–50)
MCH RBC QN AUTO: 31.9 PG (ref 28–32)
MCV RBC AUTO: 92 FL (ref 80–94)
MONOCYTES NFR BLD AUTO: 6.8 % (ref 2–8)
NEUTROPHILS NFR BLD AUTO: 52.9 % (ref 40–76)
NITRITE UR QL STRIP: NEGATIVE
PH UR STRIP: 5.5 [PH] (ref 4.5–8)
PLATELET # BLD AUTO: 174 X1000/UL (ref 130–400)
PMV BLD AUTO: 7.8 FL (ref 7.4–10.4)
PROT UR QL STRIP: NEGATIVE
RBC # BLD AUTO: 5.03 MILL/UL (ref 4.7–6.1)
SP GR UR STRIP: 1.02 (ref 1–1.03)
UROBILINOGEN UR STRIP-MCNC: 0.2 E.U./DL (ref 0.2–1)

## 2020-02-12 PROCEDURE — G0103 PSA SCREENING: HCPCS

## 2020-02-12 PROCEDURE — 85025 COMPLETE CBC W/AUTO DIFF WBC: CPT

## 2020-02-12 PROCEDURE — 81003 URINALYSIS AUTO W/O SCOPE: CPT

## 2020-02-12 PROCEDURE — 84153 ASSAY OF PSA TOTAL: CPT

## 2020-02-12 PROCEDURE — 84550 ASSAY OF BLOOD/URIC ACID: CPT

## 2020-02-12 PROCEDURE — 80053 COMPREHEN METABOLIC PANEL: CPT

## 2020-02-12 PROCEDURE — 85651 RBC SED RATE NONAUTOMATED: CPT

## 2020-02-12 PROCEDURE — 80061 LIPID PANEL: CPT

## 2020-02-12 PROCEDURE — 36415 COLL VENOUS BLD VENIPUNCTURE: CPT

## 2020-02-12 PROCEDURE — 83036 HEMOGLOBIN GLYCOSYLATED A1C: CPT
